# Patient Record
Sex: MALE | URBAN - METROPOLITAN AREA
[De-identification: names, ages, dates, MRNs, and addresses within clinical notes are randomized per-mention and may not be internally consistent; named-entity substitution may affect disease eponyms.]

---

## 2021-08-04 ENCOUNTER — OFFICE VISIT (OUTPATIENT)
Dept: URBAN - METROPOLITAN AREA CLINIC 6 | Facility: CLINIC | Age: 76
End: 2021-08-04
Payer: MEDICARE

## 2021-08-04 DIAGNOSIS — H04.123 DRY EYE SYNDROME OF BILATERAL LACRIMAL GLANDS: ICD-10-CM

## 2021-08-04 DIAGNOSIS — H18.832 RECURRENT EROSION OF CORNEA, LEFT EYE: ICD-10-CM

## 2021-08-04 DIAGNOSIS — H40.1132 PRIMARY OPEN-ANGLE GLAUCOMA, MODERATE STAGE, BILATERAL: Primary | ICD-10-CM

## 2021-08-04 PROCEDURE — 92012 INTRM OPH EXAM EST PATIENT: CPT | Performed by: OPTOMETRIST

## 2021-08-04 ASSESSMENT — INTRAOCULAR PRESSURE
OD: 19
OS: 18

## 2021-08-04 NOTE — IMPRESSION/PLAN
Impression: Primary open-angle glaucoma, moderate stage, bilateral: H40.1132: s/p SLT OU. Plan: Primary Open Angle Glaucoma OU, moderate stage -pt is a poor compliance pt and hasn't been instilling latanoprost.  Given IOP OU within target pressure, ok to d/c latanoprost QHS OU and monitor without gtt. Discussed with the patient that lack of compliance with drops/treatment and follow up visits can result in severe vision loss or blindness.

## 2021-08-04 NOTE — IMPRESSION/PLAN
Impression: Recurrent erosion of cornea, left eye: H18.832: resolved. Plan: BCL removed in office and advised to d/c tobramycin and Tete 128. Discussed the importance of daily ATs OU to prevent frequency of episodes. Monitor.